# Patient Record
Sex: MALE | Race: ASIAN | NOT HISPANIC OR LATINO
[De-identification: names, ages, dates, MRNs, and addresses within clinical notes are randomized per-mention and may not be internally consistent; named-entity substitution may affect disease eponyms.]

---

## 2020-12-15 ENCOUNTER — APPOINTMENT (OUTPATIENT)
Dept: UROLOGY | Facility: CLINIC | Age: 60
End: 2020-12-15
Payer: MEDICAID

## 2020-12-15 VITALS
SYSTOLIC BLOOD PRESSURE: 144 MMHG | RESPIRATION RATE: 18 BRPM | DIASTOLIC BLOOD PRESSURE: 81 MMHG | WEIGHT: 157 LBS | HEIGHT: 64 IN | BODY MASS INDEX: 26.8 KG/M2 | OXYGEN SATURATION: 97 % | TEMPERATURE: 97 F | HEART RATE: 73 BPM

## 2020-12-15 DIAGNOSIS — Z00.00 ENCOUNTER FOR GENERAL ADULT MEDICAL EXAMINATION W/OUT ABNORMAL FINDINGS: ICD-10-CM

## 2020-12-15 DIAGNOSIS — R39.12 POOR URINARY STREAM: ICD-10-CM

## 2020-12-15 DIAGNOSIS — N13.8 BENIGN PROSTATIC HYPERPLASIA WITH LOWER URINARY TRACT SYMPMS: ICD-10-CM

## 2020-12-15 DIAGNOSIS — Z78.9 OTHER SPECIFIED HEALTH STATUS: ICD-10-CM

## 2020-12-15 DIAGNOSIS — E11.65 TYPE 2 DIABETES MELLITUS WITH HYPERGLYCEMIA: ICD-10-CM

## 2020-12-15 DIAGNOSIS — N40.1 BENIGN PROSTATIC HYPERPLASIA WITH LOWER URINARY TRACT SYMPMS: ICD-10-CM

## 2020-12-15 LAB
ANION GAP SERPL CALC-SCNC: 16 MMOL/L
APPEARANCE: CLEAR
BACTERIA: NEGATIVE
BILIRUBIN URINE: NEGATIVE
BLOOD URINE: NEGATIVE
BUN SERPL-MCNC: 10 MG/DL
CALCIUM SERPL-MCNC: 10 MG/DL
CHLORIDE SERPL-SCNC: 103 MMOL/L
CO2 SERPL-SCNC: 20 MMOL/L
COLOR: NORMAL
CREAT SERPL-MCNC: 1.14 MG/DL
GLUCOSE QUALITATIVE U: NEGATIVE
GLUCOSE SERPL-MCNC: 135 MG/DL
HYALINE CASTS: 0 /LPF
KETONES URINE: NEGATIVE
LEUKOCYTE ESTERASE URINE: NEGATIVE
MICROSCOPIC-UA: NORMAL
NITRITE URINE: NEGATIVE
PH URINE: 6
POTASSIUM SERPL-SCNC: 5 MMOL/L
PROTEIN URINE: NEGATIVE
PSA FREE FLD-MCNC: 29 %
PSA FREE SERPL-MCNC: 0.37 NG/ML
PSA SERPL-MCNC: 1.3 NG/ML
RED BLOOD CELLS URINE: 0 /HPF
SODIUM SERPL-SCNC: 139 MMOL/L
SPECIFIC GRAVITY URINE: 1.01
SQUAMOUS EPITHELIAL CELLS: 0 /HPF
UROBILINOGEN URINE: NORMAL
WHITE BLOOD CELLS URINE: 0 /HPF

## 2020-12-15 PROCEDURE — 99204 OFFICE O/P NEW MOD 45 MIN: CPT

## 2020-12-15 PROCEDURE — 99072 ADDL SUPL MATRL&STAF TM PHE: CPT

## 2020-12-15 RX ORDER — LISINOPRIL 20 MG/1
20 TABLET ORAL
Refills: 0 | Status: ACTIVE | COMMUNITY

## 2020-12-15 RX ORDER — NAPROXEN 500 MG/1
500 TABLET, DELAYED RELEASE ORAL
Refills: 0 | Status: ACTIVE | COMMUNITY

## 2020-12-15 RX ORDER — METFORMIN ER 750 MG 750 MG/1
750 TABLET ORAL
Refills: 0 | Status: ACTIVE | COMMUNITY

## 2020-12-15 NOTE — LETTER BODY
[FreeTextEntry1] : Ashutosh Horton MD\par [address] [facility]\[place] [address]\par [phone]\par \par Dear Dr. Horotn,\par \par Reason for Visit: BPH.\par \par This is a 60 year-old working gentleman with uncontrolled diabetes mellitus type 2, presenting with symptoms of BPH. Patient is here today for evaluation. Patient reports he has weak uroflow, frequency, and hesitancy. He denies any hematuria or urinary incontinence. His symptoms are aggravated by hydration. He denies any alleviating factors. He has not tried any medical therapy previously. The patient reports an HbA1c of 8%. He reports no pain. All other review of systems are negative. He has no cancer in his family medical history. He has no previous surgical history. Past medical history, family history and social history were inquired and were noncontributory to current condition. The patient does not use tobacco or drink alcohol. Medications and allergies were reviewed. He has no known allergies to medication. \par \par On examination, the patient is a healthy-appearing gentleman in no acute distress. He is alert and oriented follows commands. He has normal mood and affect. He is normocephalic. Neck is supple. Oral no thrush Respirations are unlabored. His abdomen is soft and nontender. Bladder is nonpalpable. No CVA tenderness. Neurologically he is grossly intact. No peripheral edema. Skin without gross abnormality. He has normal male external genitalia. Normal meatus. Bilateral testes are descended intrascrotally and normal to palpation. On rectal examination, there is normal sphincter tone. The prostate is clinically benign without focal induration or nodularity.\par \par His HbA1c is 8%, which is elevated.\par \par ASSESSMENT: BPH.\par \par I counseled the patient on the various etiology of his symptoms. I discussed that his poorly managed diabetes is his most concerning issue. I discussed his uncontrolled glycosuria can lead to osmotic diuresis causing polyuria. I encouraged proper glycemic blood management and dietary restriction of carbohydrates. I discussed the natural history of BPH and the treatment options available. I discussed the options of conservative management with fluid in dietary restrictions, herbal therapy, medical therapy, and minimally invasive procedures. Risk and benefits were discussed. I answered his questions. I recommended he begin a trial of Flomax. I discussed the potential side effects of the medication. I counseled the patient on its use and side effects. If the patient develops any side effects, the patient will discontinue the medication and contact me. He will obtain BMP and PSA today to establish baselines. The patient will also obtain urinalysis today. Risks and alternatives were discussed. I answered the patient questions. The patient will follow-up as directed and will contact me with any questions or concerns. Thank you for the opportunity to participate in the care of Mr. Soto. I will keep you updated on his progress.\par \par Plan: Trial of Flomax. Improve glycemic control. BMP. PSA. Urinalysis. Follow up in 1 month.

## 2021-01-12 ENCOUNTER — APPOINTMENT (OUTPATIENT)
Dept: UROLOGY | Facility: CLINIC | Age: 61
End: 2021-01-12
Payer: MEDICAID

## 2021-01-12 VITALS
WEIGHT: 155 LBS | OXYGEN SATURATION: 97 % | SYSTOLIC BLOOD PRESSURE: 135 MMHG | HEART RATE: 86 BPM | BODY MASS INDEX: 26.61 KG/M2 | DIASTOLIC BLOOD PRESSURE: 84 MMHG | RESPIRATION RATE: 18 BRPM | TEMPERATURE: 97.7 F

## 2021-01-12 PROCEDURE — 99072 ADDL SUPL MATRL&STAF TM PHE: CPT

## 2021-01-12 PROCEDURE — 99214 OFFICE O/P EST MOD 30 MIN: CPT

## 2021-01-12 NOTE — LETTER BODY
[FreeTextEntry1] : Ashutosh Horton MD\par [address] [facility]\[place] [address]\par [phone]\par \par Dear Dr. Horton,\par \par Reason for Visit: BPH.\par \par This is a 60 year-old working gentleman with uncontrolled diabetes mellitus type 2, presenting with BPH. He returns today for follow-up. Since his last visit, the patient reports taking Flomax regularly without any side effects or difficulties with the medication. He reports of improved urinary symptoms on the medication. He denies any hematuria or urinary incontinence. He denies any pain. All other review of systems are negative. Past medical history, family history and social history were unchanged. Medications and allergies were reviewed. He has no known allergies to medication. \par \par On examination, the patient is a healthy-appearing gentleman in no acute distress. He is alert and oriented follows commands. He has normal mood and affect. He is normocephalic. Neck is supple. Oral no thrush Respirations are unlabored. His abdomen is soft and nontender. Bladder is nonpalpable. No CVA tenderness. Neurologically he is grossly intact. No peripheral edema. Skin without gross abnormality. He has normal male external genitalia. Normal meatus. Bilateral testes are descended intrascrotally and normal to palpation. On rectal examination, there is normal sphincter tone. The prostate is clinically benign without focal induration or nodularity.\par \par His BMP demonstrated normal renal functions, creatinine 1.14. His PSA was 1.3, which is within normal limits. His urinalysis was unremarkable.\par \par ASSESSMENT: BPH, symptoms improved on Flomax.\par \par I counseled the patient. In terms of his BPH, his symptoms have improved on medical therapy. I recommended he continue taking Flomax. I renewed the patient's prescription for Flomax today. I encouraged the patient to continue medications regularly as directed. The patient will follow-up in 6 months to ensure stability. Risks and alternatives were discussed. I answered the patient questions. The patient will follow-up as directed and will contact me with any questions or concerns. Thank you for the opportunity to participate in the care of Mr. Soto. I will keep you updated on his progress.\par \par Plan: Continue Flomax. Follow-up in 6 months.

## 2021-01-12 NOTE — ADDENDUM
[FreeTextEntry1] : Entered by Roberto Carpio, acting as scribe for Dr. Titus Reddy.\par \par The documentation recorded by the scribe accurately reflects the service I personally performed and the decisions made by me.\par

## 2021-07-30 ENCOUNTER — APPOINTMENT (OUTPATIENT)
Dept: UROLOGY | Facility: CLINIC | Age: 61
End: 2021-07-30
Payer: MEDICAID

## 2021-07-30 VITALS
SYSTOLIC BLOOD PRESSURE: 125 MMHG | RESPIRATION RATE: 18 BRPM | WEIGHT: 155 LBS | BODY MASS INDEX: 26.61 KG/M2 | TEMPERATURE: 97.1 F | DIASTOLIC BLOOD PRESSURE: 81 MMHG | OXYGEN SATURATION: 97 % | HEART RATE: 90 BPM

## 2021-07-30 PROCEDURE — 99213 OFFICE O/P EST LOW 20 MIN: CPT

## 2021-07-30 RX ORDER — TAMSULOSIN HYDROCHLORIDE 0.4 MG/1
0.4 CAPSULE ORAL
Qty: 90 | Refills: 3 | Status: ACTIVE | COMMUNITY
Start: 2020-12-15 | End: 1900-01-01

## 2021-07-30 NOTE — LETTER BODY
[FreeTextEntry1] : Ashutosh Horton MD\par [address] [facility]\[place] [address]\par [phone]\par \par Dear Dr. Horton,\par \par Reason for Visit: BPH.\par \par This is a 61 year-old working gentleman with uncontrolled diabetes mellitus type 2, presenting with BPH. He returns today for follow-up. Since his last visit, the patient reports taking Flomax regularly without any side effects or difficulties with the medication. He reports of stable urinary symptoms on the medication. He denies any hematuria or urinary incontinence. He denies any pain. All other review of systems are negative. Past medical history, family history and social history were unchanged. Medications and allergies were reviewed. He has no known allergies to medication. \par \par On examination, the patient is a healthy-appearing gentleman in no acute distress. He is alert and oriented follows commands. He has normal mood and affect. He is normocephalic. Neck is supple. Oral no thrush Respirations are unlabored. His abdomen is soft and nontender. Bladder is nonpalpable. No CVA tenderness. Neurologically he is grossly intact. No peripheral edema. Skin without gross abnormality. He has normal male external genitalia. Normal meatus. Bilateral testes are descended intrascrotally and normal to palpation. On rectal examination, there is normal sphincter tone. The prostate is clinically benign without focal induration or nodularity.\par \par His BMP from December 2020 demonstrated normal renal functions, creatinine 1.14. His PSA was 1.3, which is within normal limits. His urinalysis was unremarkable.\par \par ASSESSMENT: BPH, symptoms stable on Flomax.\par \par I counseled the patient. He is doing well. In terms of his BPH, his symptoms are stable on medical therapy. I recommended he continue taking Flomax. I renewed the patient's prescription for Flomax today. I encouraged the patient to continue medications regularly as directed. The patient will follow-up in 1 year  to ensure stability. Risks and alternatives were discussed. I answered the patient questions. The patient will follow-up as directed and will contact me with any questions or concerns. Thank you for the opportunity to participate in the care of Mr. Soto. I will keep you updated on his progress.\par \par Plan: Continue Flomax. Follow-up in 1 year.  \par \par .t30

## 2021-07-30 NOTE — ADDENDUM
[FreeTextEntry1] : Entered by Regis Huizar, acting as scribe for Dr. Titus Reddy.\par \par The documentation recorded by the scribe accurately reflects the service I personally performed and the decisions made by me.

## 2021-07-30 NOTE — LETTER BODY
[FreeTextEntry1] : Ashutosh Horton MD\par [address] [facility]\[place] [address]\par [phone]\par \par Dear Dr. Horton,\par \par Reason for Visit: BPH.\par \par This is a 61 year-old working gentleman with uncontrolled diabetes mellitus type 2, presenting with BPH. He returns today for follow-up. Since his last visit, the patient reports taking Flomax regularly without any side effects or difficulties with the medication. He reports of stable urinary symptoms on the medication. He denies any hematuria or urinary incontinence. He denies any pain. All other review of systems are negative. Past medical history, family history and social history were unchanged. Medications and allergies were reviewed. He has no known allergies to medication. \par \par On examination, the patient is a healthy-appearing gentleman in no acute distress. He is alert and oriented follows commands. He has normal mood and affect. He is normocephalic. Neck is supple. Oral no thrush Respirations are unlabored. His abdomen is soft and nontender. Bladder is nonpalpable. No CVA tenderness. Neurologically he is grossly intact. No peripheral edema. Skin without gross abnormality. He has normal male external genitalia. Normal meatus. Bilateral testes are descended intrascrotally and normal to palpation. On rectal examination, there is normal sphincter tone. The prostate is clinically benign without focal induration or nodularity.\par \par His BMP from December 2020 demonstrated normal renal functions, creatinine 1.14. His PSA was 1.3, which is within normal limits. His urinalysis was unremarkable.\par \par ASSESSMENT: BPH, symptoms stable on Flomax.\par \par I counseled the patient. He is doing well. In terms of his BPH, his symptoms are stable on medical therapy. I recommended he continue taking Flomax. I renewed the patient's prescription for Flomax today. I encouraged the patient to continue medications regularly as directed. The patient will follow-up in 1 year  to ensure stability. Risks and alternatives were discussed. I answered the patient questions. The patient will follow-up as directed and will contact me with any questions or concerns. Thank you for the opportunity to participate in the care of Mr. Soto. I will keep you updated on his progress.\par \par Plan: Continue Flomax. Follow-up in 1 year.  \par \par .t30

## 2021-07-30 NOTE — HISTORY OF PRESENT ILLNESS
[FreeTextEntry1] : Please refer to [facility] Consult note \par \par fua bph\par stable \par \par Followup in 1 year \par  \par

## 2021-09-09 NOTE — ADDENDUM
[FreeTextEntry1] : Entered by Roberto Carpio, acting as scribe for Dr. Titus Reddy.\par \par The documentation recorded by the scribe accurately reflects the service I personally performed and the decisions made by me.\par 
There are no Wet Read(s) to document.

## 2024-12-25 PROBLEM — F10.90 ALCOHOL USE: Status: INACTIVE | Noted: 2020-12-15
